# Patient Record
Sex: MALE | Race: WHITE | NOT HISPANIC OR LATINO | Employment: OTHER | URBAN - METROPOLITAN AREA
[De-identification: names, ages, dates, MRNs, and addresses within clinical notes are randomized per-mention and may not be internally consistent; named-entity substitution may affect disease eponyms.]

---

## 2018-01-01 ENCOUNTER — HOSPITAL ENCOUNTER (OUTPATIENT)
Dept: OTHER | Facility: HOSPITAL | Age: 73
Setting detail: SPECIMEN
Discharge: HOME | End: 2018-11-21
Attending: INTERNAL MEDICINE | Admitting: INTERNAL MEDICINE

## 2018-01-25 ENCOUNTER — HOSPITAL ENCOUNTER (OUTPATIENT)
Dept: PERIOP | Facility: HOSPITAL | Age: 73
Setting detail: HOSPITAL OUTPATIENT SURGERY
Discharge: HOME | End: 2018-01-25
Attending: SURGERY | Admitting: SURGERY

## 2018-01-25 LAB
GLUCOSE BLD-MCNC: 218 MG/DL (ref 70–105)
GLUCOSE BLD-MCNC: 266 MG/DL (ref 70–105)
GLUCOSE BLD-MCNC: ABNORMAL MG/DL (ref 70–105)

## 2018-01-26 LAB
BACTERIA SPEC AEROBE CULT: NORMAL
Lab: NORMAL
MICRO REPORT STATUS: NORMAL
SPECIMEN SOURCE: NORMAL

## 2018-04-27 ENCOUNTER — OFFICE (AMBULATORY)
Dept: URBAN - METROPOLITAN AREA CLINIC 64 | Facility: CLINIC | Age: 73
End: 2018-04-27

## 2018-04-27 VITALS
HEART RATE: 54 BPM | HEIGHT: 68 IN | WEIGHT: 154 LBS | DIASTOLIC BLOOD PRESSURE: 67 MMHG | SYSTOLIC BLOOD PRESSURE: 140 MMHG

## 2018-04-27 DIAGNOSIS — R14.3 FLATULENCE: ICD-10-CM

## 2018-04-27 PROCEDURE — 99202 OFFICE O/P NEW SF 15 MIN: CPT | Performed by: NURSE PRACTITIONER

## 2018-04-27 RX ORDER — SACCHAROMYCES BOULARDII 50 MG
500 CAPSULE ORAL
Qty: 60 | Refills: 2 | Status: ACTIVE
Start: 2018-04-27

## 2018-06-07 ENCOUNTER — OFFICE (AMBULATORY)
Dept: URBAN - METROPOLITAN AREA CLINIC 64 | Facility: CLINIC | Age: 73
End: 2018-06-07

## 2018-06-07 VITALS
SYSTOLIC BLOOD PRESSURE: 182 MMHG | HEIGHT: 68 IN | DIASTOLIC BLOOD PRESSURE: 83 MMHG | WEIGHT: 153 LBS | HEART RATE: 50 BPM

## 2018-06-07 DIAGNOSIS — R14.3 FLATULENCE: ICD-10-CM

## 2018-06-07 PROCEDURE — 99213 OFFICE O/P EST LOW 20 MIN: CPT | Performed by: NURSE PRACTITIONER

## 2018-06-07 RX ORDER — CIPROFLOXACIN 500 MG/1
1000 TABLET, FILM COATED ORAL
Qty: 20 | Refills: 0 | Status: COMPLETED
Start: 2018-06-07 | End: 2018-07-16

## 2018-07-16 ENCOUNTER — OFFICE (AMBULATORY)
Dept: URBAN - METROPOLITAN AREA CLINIC 64 | Facility: CLINIC | Age: 73
End: 2018-07-16

## 2018-07-16 VITALS
DIASTOLIC BLOOD PRESSURE: 59 MMHG | WEIGHT: 151 LBS | HEART RATE: 56 BPM | SYSTOLIC BLOOD PRESSURE: 117 MMHG | HEIGHT: 68 IN

## 2018-07-16 DIAGNOSIS — R14.3 FLATULENCE: ICD-10-CM

## 2018-07-16 DIAGNOSIS — Z12.11 ENCOUNTER FOR SCREENING FOR MALIGNANT NEOPLASM OF COLON: ICD-10-CM

## 2018-07-16 PROCEDURE — 99213 OFFICE O/P EST LOW 20 MIN: CPT | Performed by: NURSE PRACTITIONER

## 2018-08-28 ENCOUNTER — HOSPITAL ENCOUNTER (OUTPATIENT)
Dept: ONCOLOGY | Facility: CLINIC | Age: 73
Discharge: HOME | End: 2018-08-28
Attending: INTERNAL MEDICINE | Admitting: INTERNAL MEDICINE

## 2018-10-15 ENCOUNTER — OFFICE (AMBULATORY)
Dept: URBAN - METROPOLITAN AREA CLINIC 64 | Facility: CLINIC | Age: 73
End: 2018-10-15

## 2018-10-15 VITALS
HEIGHT: 68 IN | SYSTOLIC BLOOD PRESSURE: 162 MMHG | DIASTOLIC BLOOD PRESSURE: 66 MMHG | WEIGHT: 159 LBS | HEART RATE: 71 BPM

## 2018-10-15 DIAGNOSIS — R14.3 FLATULENCE: ICD-10-CM

## 2018-10-15 DIAGNOSIS — Z12.11 ENCOUNTER FOR SCREENING FOR MALIGNANT NEOPLASM OF COLON: ICD-10-CM

## 2018-10-15 PROCEDURE — 99213 OFFICE O/P EST LOW 20 MIN: CPT | Performed by: NURSE PRACTITIONER

## 2018-11-21 ENCOUNTER — ON CAMPUS - OUTPATIENT (AMBULATORY)
Dept: URBAN - METROPOLITAN AREA HOSPITAL 2 | Facility: HOSPITAL | Age: 73
End: 2018-11-21
Payer: OTHER GOVERNMENT

## 2018-11-21 ENCOUNTER — OFFICE (AMBULATORY)
Dept: URBAN - METROPOLITAN AREA PATHOLOGY 4 | Facility: PATHOLOGY | Age: 73
End: 2018-11-21
Payer: OTHER GOVERNMENT

## 2018-11-21 VITALS
HEART RATE: 56 BPM | HEART RATE: 61 BPM | RESPIRATION RATE: 16 BRPM | RESPIRATION RATE: 18 BRPM | DIASTOLIC BLOOD PRESSURE: 56 MMHG | OXYGEN SATURATION: 98 % | DIASTOLIC BLOOD PRESSURE: 38 MMHG | DIASTOLIC BLOOD PRESSURE: 40 MMHG | DIASTOLIC BLOOD PRESSURE: 53 MMHG | SYSTOLIC BLOOD PRESSURE: 83 MMHG | DIASTOLIC BLOOD PRESSURE: 66 MMHG | OXYGEN SATURATION: 99 % | OXYGEN SATURATION: 96 % | RESPIRATION RATE: 14 BRPM | SYSTOLIC BLOOD PRESSURE: 88 MMHG | DIASTOLIC BLOOD PRESSURE: 64 MMHG | SYSTOLIC BLOOD PRESSURE: 100 MMHG | HEIGHT: 68 IN | OXYGEN SATURATION: 100 % | HEART RATE: 46 BPM | HEART RATE: 43 BPM | RESPIRATION RATE: 15 BRPM | DIASTOLIC BLOOD PRESSURE: 88 MMHG | TEMPERATURE: 97.3 F | SYSTOLIC BLOOD PRESSURE: 185 MMHG | OXYGEN SATURATION: 95 % | SYSTOLIC BLOOD PRESSURE: 111 MMHG | SYSTOLIC BLOOD PRESSURE: 139 MMHG | OXYGEN SATURATION: 97 % | SYSTOLIC BLOOD PRESSURE: 108 MMHG | SYSTOLIC BLOOD PRESSURE: 119 MMHG | HEART RATE: 49 BPM | HEART RATE: 63 BPM | HEART RATE: 55 BPM | HEART RATE: 71 BPM | HEART RATE: 62 BPM | WEIGHT: 153.8 LBS | DIASTOLIC BLOOD PRESSURE: 68 MMHG

## 2018-11-21 DIAGNOSIS — D12.3 BENIGN NEOPLASM OF TRANSVERSE COLON: ICD-10-CM

## 2018-11-21 DIAGNOSIS — R19.5 OTHER FECAL ABNORMALITIES: ICD-10-CM

## 2018-11-21 DIAGNOSIS — K57.30 DIVERTICULOSIS OF LARGE INTESTINE WITHOUT PERFORATION OR ABS: ICD-10-CM

## 2018-11-21 DIAGNOSIS — Z15.09 GENETIC SUSCEPTIBILITY TO OTHER MALIGNANT NEOPLASM: ICD-10-CM

## 2018-11-21 DIAGNOSIS — K64.8 OTHER HEMORRHOIDS: ICD-10-CM

## 2018-11-21 PROBLEM — D12.2 BENIGN NEOPLASM OF ASCENDING COLON: Status: ACTIVE | Noted: 2018-11-21

## 2018-11-21 LAB
GI HISTOLOGY: A. UNSPECIFIED: (no result)
GI HISTOLOGY: PDF REPORT: (no result)

## 2018-11-21 PROCEDURE — 45385 COLONOSCOPY W/LESION REMOVAL: CPT | Performed by: INTERNAL MEDICINE

## 2018-11-21 PROCEDURE — 88305 TISSUE EXAM BY PATHOLOGIST: CPT | Mod: 26 | Performed by: INTERNAL MEDICINE

## 2018-11-21 RX ADMIN — INSULIN HUMAN 15 UNITS: 100 INJECTION, SOLUTION PARENTERAL at 11:13

## 2018-11-21 NOTE — SERVICEHPINOTES
+cologuard 10/18BRhistory of dementia, COPD, diabetes, CAD/CABG and kidney disease.BRNo family history of colon cancer and he denies previous colonoscopyBR  JOSTIN MARIN  is a  72  male   who presents today for a  Colonoscopy   for   the indications listed below. The updated Patient Profile was reviewed prior to the procedure, in conjunction with the Physical Exam, including medical conditions, surgical procedures, medications, allergies, family history and social history. See Physical Exam time stamp below for date and time of HPI completion.Pre-operatively, I reviewed the indication(s) for the procedure, the risks of the procedure [including but not limited to: unexpected bleeding possibly requiring hospitalization and/or unplanned repeat procedures, perforation possibly requiring surgical treatment, missed lesions and complications of sedation/MAC (also explained by anesthesia staff)]. I have evaluated the patient for risks associated with the planned anesthesia and the procedure to be performed and find the patient an acceptable candidate for IV sedation.Multiple opportunities were provided for any questions or concerns, and all questions were answered satisfactorily before any anesthesia was administered. We will proceed with the planned procedure.BR

## 2019-01-01 ENCOUNTER — HOSPITAL ENCOUNTER (OUTPATIENT)
Dept: ORTHOPEDIC SURGERY | Facility: CLINIC | Age: 74
Discharge: HOME | End: 2019-06-12
Attending: PODIATRIST | Admitting: PODIATRIST

## 2019-01-01 ENCOUNTER — TELEPHONE (OUTPATIENT)
Dept: ENDOCRINOLOGY | Facility: CLINIC | Age: 74
End: 2019-01-01

## 2019-01-01 ENCOUNTER — CONVERSION ENCOUNTER (OUTPATIENT)
Dept: CARDIOLOGY | Facility: CLINIC | Age: 74
End: 2019-01-01

## 2019-01-01 ENCOUNTER — HOSPITAL ENCOUNTER (OUTPATIENT)
Dept: ORTHOPEDIC SURGERY | Facility: CLINIC | Age: 74
Discharge: HOME | End: 2019-04-24
Attending: PODIATRIST | Admitting: PODIATRIST

## 2019-01-01 ENCOUNTER — OFFICE VISIT (OUTPATIENT)
Dept: NEUROLOGY | Facility: CLINIC | Age: 74
End: 2019-01-01

## 2019-01-01 ENCOUNTER — OFFICE VISIT (OUTPATIENT)
Dept: PODIATRY | Facility: CLINIC | Age: 74
End: 2019-01-01

## 2019-01-01 ENCOUNTER — HOSPITAL ENCOUNTER (OUTPATIENT)
Dept: ORTHOPEDIC SURGERY | Facility: CLINIC | Age: 74
Discharge: HOME | End: 2019-05-22
Attending: PODIATRIST | Admitting: PODIATRIST

## 2019-01-01 ENCOUNTER — OFFICE VISIT (OUTPATIENT)
Dept: ENDOCRINOLOGY | Facility: CLINIC | Age: 74
End: 2019-01-01

## 2019-01-01 VITALS
WEIGHT: 160 LBS | OXYGEN SATURATION: 96 % | HEIGHT: 68 IN | BODY MASS INDEX: 24.25 KG/M2 | SYSTOLIC BLOOD PRESSURE: 124 MMHG | DIASTOLIC BLOOD PRESSURE: 64 MMHG | HEART RATE: 59 BPM

## 2019-01-01 VITALS
SYSTOLIC BLOOD PRESSURE: 154 MMHG | HEIGHT: 66 IN | HEART RATE: 61 BPM | WEIGHT: 150 LBS | DIASTOLIC BLOOD PRESSURE: 65 MMHG | BODY MASS INDEX: 24.11 KG/M2

## 2019-01-01 VITALS
SYSTOLIC BLOOD PRESSURE: 134 MMHG | HEIGHT: 66 IN | DIASTOLIC BLOOD PRESSURE: 53 MMHG | BODY MASS INDEX: 25.88 KG/M2 | HEART RATE: 58 BPM | WEIGHT: 161 LBS

## 2019-01-01 VITALS
OXYGEN SATURATION: 97 % | DIASTOLIC BLOOD PRESSURE: 60 MMHG | HEART RATE: 51 BPM | BODY MASS INDEX: 22.81 KG/M2 | SYSTOLIC BLOOD PRESSURE: 125 MMHG | HEIGHT: 68 IN

## 2019-01-01 VITALS — BODY MASS INDEX: 22.43 KG/M2 | WEIGHT: 148 LBS | HEIGHT: 68 IN

## 2019-01-01 VITALS — OXYGEN SATURATION: 95 % | SYSTOLIC BLOOD PRESSURE: 67 MMHG | DIASTOLIC BLOOD PRESSURE: 37 MMHG | HEART RATE: 75 BPM

## 2019-01-01 DIAGNOSIS — E78.5 HYPERLIPIDEMIA, UNSPECIFIED HYPERLIPIDEMIA TYPE: ICD-10-CM

## 2019-01-01 DIAGNOSIS — E10.65 TYPE 1 DIABETES MELLITUS WITH HYPERGLYCEMIA (HCC): Primary | ICD-10-CM

## 2019-01-01 DIAGNOSIS — I10 ESSENTIAL HYPERTENSION: ICD-10-CM

## 2019-01-01 DIAGNOSIS — E78.2 MIXED HYPERLIPIDEMIA: ICD-10-CM

## 2019-01-01 DIAGNOSIS — E03.9 ACQUIRED HYPOTHYROIDISM: ICD-10-CM

## 2019-01-01 DIAGNOSIS — G25.81 RESTLESS LEGS SYNDROME: Primary | ICD-10-CM

## 2019-01-01 DIAGNOSIS — M25.571 ACUTE RIGHT ANKLE PAIN: Primary | ICD-10-CM

## 2019-01-01 DIAGNOSIS — E11.42 DIABETIC PERIPHERAL NEUROPATHY (HCC): ICD-10-CM

## 2019-01-01 LAB — GLUCOSE BLDC GLUCOMTR-MCNC: 278 MG/DL (ref 70–130)

## 2019-01-01 PROCEDURE — 82962 GLUCOSE BLOOD TEST: CPT | Performed by: INTERNAL MEDICINE

## 2019-01-01 PROCEDURE — 99213 OFFICE O/P EST LOW 20 MIN: CPT | Performed by: PODIATRIST

## 2019-01-01 PROCEDURE — 99214 OFFICE O/P EST MOD 30 MIN: CPT | Performed by: INTERNAL MEDICINE

## 2019-01-01 PROCEDURE — 99214 OFFICE O/P EST MOD 30 MIN: CPT | Performed by: PSYCHIATRY & NEUROLOGY

## 2019-01-01 RX ORDER — ASPIRIN 81 MG/1
81 TABLET ORAL DAILY
COMMUNITY

## 2019-01-01 RX ORDER — SACCHAROMYCES BOULARDII 250 MG
CAPSULE ORAL
COMMUNITY
Start: 2018-01-01

## 2019-01-01 RX ORDER — MULTIVITAMIN
TABLET ORAL
COMMUNITY
Start: 2015-09-11

## 2019-01-01 RX ORDER — HYDROCODONE BITARTRATE AND ACETAMINOPHEN 7.5; 325 MG/1; MG/1
TABLET ORAL
COMMUNITY
Start: 2014-10-20

## 2019-01-01 RX ORDER — CETIRIZINE HYDROCHLORIDE 10 MG/1
10 TABLET ORAL DAILY
COMMUNITY

## 2019-01-01 RX ORDER — ISOSORBIDE MONONITRATE 30 MG/1
TABLET, EXTENDED RELEASE ORAL EVERY 24 HOURS
COMMUNITY
Start: 2015-09-11

## 2019-01-01 RX ORDER — GABAPENTIN 600 MG/1
TABLET ORAL EVERY 24 HOURS
COMMUNITY
Start: 2019-01-01 | End: 2019-01-01

## 2019-01-01 RX ORDER — ROPINIROLE 2 MG/1
TABLET, FILM COATED ORAL EVERY 24 HOURS
COMMUNITY
Start: 2018-05-15

## 2019-01-01 RX ORDER — CALCIUM CARBONATE 300MG(750)
TABLET,CHEWABLE ORAL
COMMUNITY
Start: 2019-01-01

## 2019-01-01 RX ORDER — DONEPEZIL HYDROCHLORIDE 10 MG/1
TABLET, FILM COATED ORAL EVERY 12 HOURS
COMMUNITY
Start: 2019-01-01

## 2019-01-01 RX ORDER — IPRATROPIUM BROMIDE AND ALBUTEROL SULFATE 2.5; .5 MG/3ML; MG/3ML
SOLUTION RESPIRATORY (INHALATION)
COMMUNITY
Start: 2015-09-11

## 2019-01-01 RX ORDER — CHOLECALCIFEROL (VITAMIN D3) 1250 MCG
CAPSULE ORAL
COMMUNITY
Start: 2017-02-16 | End: 2019-01-01

## 2019-01-01 RX ORDER — PROMETHAZINE HYDROCHLORIDE 25 MG/1
TABLET ORAL
COMMUNITY
Start: 2019-01-01

## 2019-01-01 RX ORDER — LANOLIN ALCOHOL/MO/W.PET/CERES
CREAM (GRAM) TOPICAL
COMMUNITY
Start: 2018-01-01

## 2019-01-01 RX ORDER — ATORVASTATIN CALCIUM 10 MG/1
10 TABLET, FILM COATED ORAL DAILY
Qty: 30 TABLET | Refills: 11 | Status: SHIPPED | OUTPATIENT
Start: 2019-01-01 | End: 2020-10-23

## 2019-01-01 RX ORDER — ESCITALOPRAM OXALATE 20 MG/1
TABLET ORAL EVERY 24 HOURS
COMMUNITY
Start: 2014-10-20

## 2019-01-01 RX ORDER — BENZTROPINE MESYLATE 1 MG/1
TABLET ORAL EVERY 24 HOURS
COMMUNITY
Start: 2019-01-01

## 2019-01-01 RX ORDER — INSULIN GLARGINE 100 [IU]/ML
INJECTION, SOLUTION SUBCUTANEOUS
Qty: 10 ML | Refills: 4 | Status: SHIPPED | OUTPATIENT
Start: 2019-01-01

## 2019-01-01 RX ORDER — INSULIN GLARGINE 100 [IU]/ML
INJECTION, SOLUTION SUBCUTANEOUS
COMMUNITY
Start: 2015-09-11 | End: 2019-01-01 | Stop reason: SDUPTHER

## 2019-01-01 RX ORDER — DULOXETIN HYDROCHLORIDE 60 MG/1
CAPSULE, DELAYED RELEASE ORAL
COMMUNITY
Start: 2018-01-01

## 2019-01-01 RX ORDER — PREGABALIN 300 MG/1
1 CAPSULE ORAL EVERY 12 HOURS
COMMUNITY
Start: 2017-02-16

## 2019-01-01 RX ORDER — BUSPIRONE HYDROCHLORIDE 15 MG/1
TABLET ORAL DAILY
COMMUNITY
Start: 2018-01-01

## 2019-01-01 RX ORDER — LEVOTHYROXINE SODIUM 0.05 MG/1
TABLET ORAL
COMMUNITY
Start: 2014-10-20

## 2019-01-01 RX ORDER — BUDESONIDE 0.5 MG/2ML
INHALANT ORAL
COMMUNITY
Start: 2015-09-11

## 2019-01-01 RX ORDER — HYDRALAZINE HYDROCHLORIDE 25 MG/1
TABLET, FILM COATED ORAL EVERY 12 HOURS
COMMUNITY
Start: 2014-10-20 | End: 2019-01-01

## 2019-01-01 RX ORDER — PRAMIPEXOLE DIHYDROCHLORIDE 0.25 MG/1
TABLET ORAL
Qty: 60 TABLET | Refills: 11 | Status: SHIPPED | OUTPATIENT
Start: 2019-01-01

## 2019-01-01 RX ORDER — ROPINIROLE 4 MG/1
TABLET, FILM COATED ORAL NIGHTLY
COMMUNITY
Start: 2015-09-11

## 2019-01-01 RX ORDER — FERROUS SULFATE 325(65) MG
TABLET ORAL
COMMUNITY
Start: 2015-09-11

## 2019-01-01 RX ORDER — BACLOFEN 10 MG/1
TABLET ORAL
COMMUNITY
Start: 2017-02-16

## 2019-01-01 RX ORDER — ALBUTEROL SULFATE 90 UG/1
AEROSOL, METERED RESPIRATORY (INHALATION)
COMMUNITY
Start: 2014-10-20

## 2019-06-06 NOTE — PROGRESS NOTES
Visit Type:  Follow-up Visit  Referring Provider:  Dr Wright   Primary Provider:  Hipolito Wright MD    CC:  6 mo f/u CAD .    History of Present Illness:    73-year-old white male with history of coronary disease diabetes hypertension hyperlipidemia COPD congestive heart failure presents to office for follow-up.  Patient is not having any symptoms of chest pain or shortness of breath at rest or exertion.  No   complaints of any PND or orthopnea.  No palpitations dizziness syncope or swelling of the feet.  Says this these medicines regularly.  He does not smoke.  He is having very low blood pressure.   I have offered him to since the ER because of his low blood   pressure but is refusing.      Vital Signs:    Patient Profile:    73 Years Old Male  Unable to do weight check  Height:     66 inches  Pulse rate: 75 / minute  O2 Sat:     95 %  Room Air:   room air without exertion    BP sittin / 37  (right arm)  Cuff size:  regular   Vitals Entered By: Beatris Blevins CMA (May 28, 2019 12:40 PM)    Medications:  Medications were reviewed with the patient during this visit.    Allergies:   * KEPPRA (Critical)    Allergies were reviewed with the patient during this visit.    Current Allergies (reviewed today):  * KEPPRA (Critical)    Current Medications (including medications started today):   MAGNESIUM 400 MG ORAL TABLET (MAGNESIUM) Take one (1) tablet by mouth twice a day  PROMETHAZINE HCL 25 MG ORAL TABLET (PROMETHAZINE HCL) As directed  BENZTROPINE MESYLATE 1 MG ORAL TABLET (BENZTROPINE MESYLATE) Take 1 tablet by mouth daily  ARICEPT 10 MG ORAL TABLET (DONEPEZIL HCL) one po bid  GABAPENTIN 600 MG ORAL TABLET (GABAPENTIN) Take 1 tablet by mouth daily  * IB KIP 90MG 1-2 tablets up to three times per day  GLUCAGON EMERGENCY 1 MG INJECTION KIT (GLUCAGON (RDNA)) Use as directed  BUSPIRONE HCL 15 MG ORAL TABLET (BUSPIRONE HCL) Take one (1) tablet by mouth three times a day  MELATONIN 3 MG ORAL TABLET (MELATONIN) Take 1 tablet  by mouth daily at bedtime  FLORASTOR 250 MG ORAL CAPSULE (SACCHAROMYCES BOULARDII) Take one (1) tablet by mouth twice a day  CYMBALTA 60 MG ORAL CAPSULE DELAYED RELEASE PARTICLES (DULOXETINE HCL) Take 1 tablet by mouth daily  NOVOLOG 100 UNIT/ML SUBCUTANEOUS SOLUTION (INSULIN ASPART) -250=1unit 251-300=2 301-350=3 351-400=4 401-450=5 call doctor over 450.  Only give with meal, do not give at bedtime.  REQUIP 2 MG ORAL TABLET (ROPINIROLE HCL) Take 1 tablet by mouth daily  LEVOTHYROXINE SODIUM 50 MCG ORAL TABLET (LEVOTHYROXINE SODIUM) Take one by mouth daily  VITAMIN D3 97017 UNIT ORAL CAPSULE (CHOLECALCIFEROL) Take one capsule by mouth once a week  DAILY ISAIAH ORAL TABLET (MULTIPLE VITAMIN) Take 1 tablet by mouth daily  ISOSORBIDE MONONITRATE ER 30 MG ORAL TABLET EXTENDED RELEASE 24 HOUR (ISOSORBIDE MONONITRATE) 1 tablet daily  ROPINIROLE HCL 4 MG ORAL TABLET (ROPINIROLE HCL) Take 1 tablet by mouth daily  BACLOFEN 10 MG ORAL TABLET (BACLOFEN) Take 1 tablet by mouth daily at bedtime  FERROUS SULFATE 325 (65 FE) MG ORAL TABLET (FERROUS SULFATE) Take one (1) tablet by mouth three a day  HYDRALAZINE HCL 25 MG ORAL TABLET (HYDRALAZINE HCL) Take one (1) tablet by mouth twice a day  LYRICA 300 MG ORAL CAPSULE (PREGABALIN) 1 po bid  LEXAPRO 20 MG ORAL TABLET (ESCITALOPRAM OXALATE) Take 1 tablet by mouth daily  HYDROCODONE-ACETAMINOPHEN 7.5-325 MG ORAL TABLET (HYDROCODONE-ACETAMINOPHEN) Takes 1 every 4 hours  VENTOLIN  (90 Base) MCG/ACT INHALATION AEROSOL SOLUTION (ALBUTEROL SULFATE) 2 puffs every 4 hours as needed  LANTUS 100 UNIT/ML SUBCUTANEOUS SOLUTION (INSULIN GLARGINE) Inject 12 units subqutaneously at bedtime  NOVOLOG 100 UNIT/ML SUBCUTANEOUS SOLUTION (INSULIN ASPART) inject 4 units before Breakfast,  and 6 units before lunch and dinner  plus SSI. give right at mealtime  IPRATROPIUM-ALBUTEROL 0.5-2.5 (3) MG/3ML INHALATION SOLUTION (IPRATROPIUM-ALBUTEROL) 3 ml via nebulizer every 6 hours  BUDESONIDE 0.5  MG/2ML INHALATION SUSPENSION (BUDESONIDE) inhale 2 ml via nebulizer every 12 hours      Past Medical History:     Reviewed history from 10/25/2018 and no changes required:        Hypertension        Hyperlipidemia                early dementia        Coronary Artery Disease        CVA        Hepatic failure        Thrombocytopenia        Diabetes, Type 1        diabetic neuropathy.     Past Surgical History:     Reviewed history from 02/14/2018 and no changes required:        CABG - 2011        heart cath 11/2014 - 100% LAD occlusion, 50% RCA stenosis, and left circumflex free of disease, patent LIMA to LAD, SVG graft to diagonal branch patent, SVG to left circumflex occluded        Ventral hernia repair  01/25/18  Dr Woods    Family History Summary:      Reviewed history Last on 05/22/2019 and no changes required:05/28/2019  Mother - Has Family History of Heart Disease - Entered On: 2/16/2017    General Comments - FH:  FH Heart Disease mother  FH Stroke - father  diabetes-son      Social History:     Reviewed history from 11/13/2018 and no changes required:        Alcohol Use: N        Drug Use: N        HIV/High Risk: N        Regular Exercise: N                Smoking History:        Patient is a former smoker.    Social History Summary:  Patient is a former smoker.  Alcohol Use: N  Drug Use: N  HIV/High Risk: N  Regular Exercise: N  Social History Reviewed: 05/28/2019          Risk Factors:     Smoked Tobacco Use:  Former smoker     Cigarettes:  Yes -- 0.5 PPD pack(s) per day,      Year quit:  2018        Years Since Last Quit:  1      Tobacco Use Comments:  Patient is advised to stop smoking.  Drug use:  no  HIV high-risk behavior:  no  Caffeine use:  1 drinks per day  Alcohol use:  no  Exercise:  no  Seatbelt use:  100 %  Sun Exposure:  rarely    Family History Risk Factors:     Family History of MI in females < 65 years old:  no     Family History of MI in males < 55 years old:  no    Previous Tobacco Use:  Signed On - 05/22/2019  Smoked Tobacco Use:  Former smoker     Cigarettes:  Yes -- 0.5 PPD pack(s) per day,      Year quit:  2018        Years Since Last Quit:  1 years, 4 months, 27 days     Counseled to quit/cut down:  yes     Tobacco Use Comments:  Patient is advised to stop smoking.  Drug use:  no  HIV high-risk behavior:  no  Caffeine use:  1 drinks per day    Previous Alcohol Use: Signed On - 05/22/2019  Alcohol use:  no  Exercise:  no  Seatbelt use:  100 %  Sun Exposure:  rarely    Family History Risk Factors:     Family History of MI in females < 65 years old:  no     Family History of MI in males < 55 years old:  no        Review of Systems     General       Denies fever, chills, sweats, fatigue and weakness.    Eyes       Denies blurring.    CV       Denies chest pain or discomfort, racing/skipping heart beats, fatigue, lightheadedness, shortness of breath with exertion, palpitations, swelling of hands or feet and difficulty breathing while lying down.    Resp       Denies cough and shortness of breath.    GI       Denies loss of appetite, nausea, vomiting, abdominal pain, diarrhea and constipation.    Derm       Denies rash.    Neuro       Denies numbness and weakness.    Heme       Complains of bleeding.       Denies nose bleeds.      Physical Exam    General:       frail and elderly male; no acute distress.  Head:      normocephalic and atraumatic.    Eyes:      PERRL/EOM intact, conjunctiva and sclera clear with out nystagmus.    Neck:      no bruit and no JVD.    Lungs:      clear bilaterally to auscultation.    Heart:      non-displaced PMI, chest non-tender; regular rate and rhythm, S1, S2 without murmurs, rubs, or gallops  Abdomen:      non-tender.    Msk:      decreased ROM.    Pulses:      pulses normal in all 4 extremities.    Extremities:      no pedal edema.    Neurologic:       focal deficits  Skin:      intact without lesions or rashes.    Psych:      alert and cooperative; normal mood and  affect; normal attention span and concentration.      Diabetes Management Exam:      Foot Exam (with socks and/or shoes not present):        Pulses:           pulses normal in all 4 extremities.        Blood Pressure:  Today's BP: 67/37 mm Hg            Impression & Recommendations:    Problem # 1:  Diabetes mellitus, type I, uncontrolled (ICD-250.03) (RWH33-M14.65)   patient's sugar levels are followed by a primary doctor  His updated medication list for this problem includes:     Glucagon Emergency 1 Mg Injection Kit (Glucagon (rdna)) ..... Use as directed     Novolog 100 Unit/ml Subcutaneous Solution (Insulin aspart) ..... Ssi 201-250=1unit 251-300=2 301-350=3 351-400=4 401-450=5 call doctor over 450.  only give with meal, do not give at bedtime.     Lantus 100 Unit/ml Subcutaneous Solution (Insulin glargine) ..... Inject 12 units subqutaneously at bedtime     Novolog 100 Unit/ml Subcutaneous Solution (Insulin aspart) ..... Inject 4 units before breakfast,  and 6 units before lunch and dinner  plus ssi. give right at mealtime      Problem # 2:  Hyperlipidemia (ICD-272.4) (DTR01-J78.5)   patient's lipid levels are followed by the primary care doctor    Problem # 3:  Hypertension (ICD-401.9) (CCZ31-F86)   patient blood pressure is very low and I have asked stop his medicines have asked to go to the ER for IV fluids but is refusing it.  I will watch him here in my office for a few more minutes and recheck his blood pressure  His updated medication list for this problem includes:     Hydralazine Hcl 25 Mg Oral Tablet (Hydralazine hcl) ..... Take one (1) tablet by mouth twice a day      Problem # 4:  CONGESTIVE HEART FAILURE (ICD-428.0) (BRO61-M61.9)   patient has class 1 symptoms at this time    Problem # 5:  Coronary artery disease, S/P CABG (ICD-414.00) (QBD68-K97.810)   patient had Coronary bypass the x3 vessels with a LIMA to LAD and saphenous graft to the marginal branch of the RCA and is currently stable on  medications  His updated medication list for this problem includes:     Isosorbide Mononitrate Er 30 Mg Oral Tablet Extended Release 24 Hour (Isosorbide mononitrate) ..... 1 tablet daily     Hydralazine Hcl 25 Mg Oral Tablet (Hydralazine hcl) ..... Take one (1) tablet by mouth twice a day                    Medication Administration    Orders Added:  1)  26205-[id number] Vst-Est Level III [CPT-42961]    ]      Electronically signed by Cornell Pitts MD on 05/28/2019 at 1:05 PM  ________________________________________________________________________       Disclaimer: Converted Note message may not contain all data elements that existed in the [employer] source system. Please see [employer] for the original note details.

## 2019-06-13 NOTE — PROGRESS NOTES
Visit Type:  Acute Visit  Referring Provider:  Dr Wright   Primary Provider:  Hipolito Wright MD    CC:   Right ankle FX F/U.    History of Present Illness:  Patient states that he is doing well.  He denies any new issues.  He is eager to return to baseline activity.      Past Medical History:     Reviewed history from 10/25/2018 and no changes required:        Hypertension        Hyperlipidemia                early dementia        Coronary Artery Disease        CVA        Hepatic failure        Thrombocytopenia        Diabetes, Type 1        diabetic neuropathy.     Past Surgical History:     Reviewed history from 02/14/2018 and no changes required:        CABG - 2011        heart cath 11/2014 - 100% LAD occlusion, 50% RCA stenosis, and left circumflex free of disease, patent LIMA to LAD, SVG graft to diagonal branch patent, SVG to left circumflex occluded        Ventral hernia repair  01/25/18  Dr Woods    Active Medications (reviewed today):  MAGNESIUM 400 MG ORAL TABLET (MAGNESIUM) Take one (1) tablet by mouth twice a day  PROMETHAZINE HCL 25 MG ORAL TABLET (PROMETHAZINE HCL) As directed  BENZTROPINE MESYLATE 1 MG ORAL TABLET (BENZTROPINE MESYLATE) Take 1 tablet by mouth daily  ARICEPT 10 MG ORAL TABLET (DONEPEZIL HCL) one po bid  GABAPENTIN 600 MG ORAL TABLET (GABAPENTIN) Take 1 tablet by mouth daily  * IB KIP 90MG 1-2 tablets up to three times per day  GLUCAGON EMERGENCY 1 MG INJECTION KIT (GLUCAGON (RDNA)) Use as directed  BUSPIRONE HCL 15 MG ORAL TABLET (BUSPIRONE HCL) Take one (1) tablet by mouth three times a day  MELATONIN 3 MG ORAL TABLET (MELATONIN) Take 1 tablet by mouth daily at bedtime  FLORASTOR 250 MG ORAL CAPSULE (SACCHAROMYCES BOULARDII) Take one (1) tablet by mouth twice a day  CYMBALTA 60 MG ORAL CAPSULE DELAYED RELEASE PARTICLES (DULOXETINE HCL) Take 1 tablet by mouth daily  NOVOLOG 100 UNIT/ML SUBCUTANEOUS SOLUTION (INSULIN ASPART) -250=1unit 251-300=2 301-350=3 351-400=4 401-450=5 call  doctor over 450.  Only give with meal, do not give at bedtime.  REQUIP 2 MG ORAL TABLET (ROPINIROLE HCL) Take 1 tablet by mouth daily  LEVOTHYROXINE SODIUM 50 MCG ORAL TABLET (LEVOTHYROXINE SODIUM) Take one by mouth daily  VITAMIN D3 42101 UNIT ORAL CAPSULE (CHOLECALCIFEROL) Take one capsule by mouth once a week  DAILY ISAIAH ORAL TABLET (MULTIPLE VITAMIN) Take 1 tablet by mouth daily  ISOSORBIDE MONONITRATE ER 30 MG ORAL TABLET EXTENDED RELEASE 24 HOUR (ISOSORBIDE MONONITRATE) 1 tablet daily  ROPINIROLE HCL 4 MG ORAL TABLET (ROPINIROLE HCL) Take 1 tablet by mouth daily  BACLOFEN 10 MG ORAL TABLET (BACLOFEN) Take 1 tablet by mouth daily at bedtime  FERROUS SULFATE 325 (65 FE) MG ORAL TABLET (FERROUS SULFATE) Take one (1) tablet by mouth three a day  HYDRALAZINE HCL 25 MG ORAL TABLET (HYDRALAZINE HCL) Take one (1) tablet by mouth twice a day  LYRICA 300 MG ORAL CAPSULE (PREGABALIN) 1 po bid  LEXAPRO 20 MG ORAL TABLET (ESCITALOPRAM OXALATE) Take 1 tablet by mouth daily  HYDROCODONE-ACETAMINOPHEN 7.5-325 MG ORAL TABLET (HYDROCODONE-ACETAMINOPHEN) Takes 1 every 4 hours  VENTOLIN  (90 Base) MCG/ACT INHALATION AEROSOL SOLUTION (ALBUTEROL SULFATE) 2 puffs every 4 hours as needed  LANTUS 100 UNIT/ML SUBCUTANEOUS SOLUTION (INSULIN GLARGINE) Inject 12 units subqutaneously at bedtime  NOVOLOG 100 UNIT/ML SUBCUTANEOUS SOLUTION (INSULIN ASPART) inject 4 units before Breakfast,  and 6 units before lunch and dinner  plus SSI. give right at mealtime  IPRATROPIUM-ALBUTEROL 0.5-2.5 (3) MG/3ML INHALATION SOLUTION (IPRATROPIUM-ALBUTEROL) 3 ml via nebulizer every 6 hours  BUDESONIDE 0.5 MG/2ML INHALATION SUSPENSION (BUDESONIDE) inhale 2 ml via nebulizer every 12 hours    Current Allergies (reviewed today):  * KEPPRA (Critical)    Current Medications (including medications started today):   MAGNESIUM 400 MG ORAL TABLET (MAGNESIUM) Take one (1) tablet by mouth twice a day  PROMETHAZINE HCL 25 MG ORAL TABLET (PROMETHAZINE HCL) As  directed  BENZTROPINE MESYLATE 1 MG ORAL TABLET (BENZTROPINE MESYLATE) Take 1 tablet by mouth daily  ARICEPT 10 MG ORAL TABLET (DONEPEZIL HCL) one po bid  GABAPENTIN 600 MG ORAL TABLET (GABAPENTIN) Take 1 tablet by mouth daily  * IB KIP 90MG 1-2 tablets up to three times per day  GLUCAGON EMERGENCY 1 MG INJECTION KIT (GLUCAGON (RDNA)) Use as directed  BUSPIRONE HCL 15 MG ORAL TABLET (BUSPIRONE HCL) Take one (1) tablet by mouth three times a day  MELATONIN 3 MG ORAL TABLET (MELATONIN) Take 1 tablet by mouth daily at bedtime  FLORASTOR 250 MG ORAL CAPSULE (SACCHAROMYCES BOULARDII) Take one (1) tablet by mouth twice a day  CYMBALTA 60 MG ORAL CAPSULE DELAYED RELEASE PARTICLES (DULOXETINE HCL) Take 1 tablet by mouth daily  NOVOLOG 100 UNIT/ML SUBCUTANEOUS SOLUTION (INSULIN ASPART) -250=1unit 251-300=2 301-350=3 351-400=4 401-450=5 call doctor over 450.  Only give with meal, do not give at bedtime.  REQUIP 2 MG ORAL TABLET (ROPINIROLE HCL) Take 1 tablet by mouth daily  LEVOTHYROXINE SODIUM 50 MCG ORAL TABLET (LEVOTHYROXINE SODIUM) Take one by mouth daily  VITAMIN D3 01196 UNIT ORAL CAPSULE (CHOLECALCIFEROL) Take one capsule by mouth once a week  DAILY ISAIAH ORAL TABLET (MULTIPLE VITAMIN) Take 1 tablet by mouth daily  ISOSORBIDE MONONITRATE ER 30 MG ORAL TABLET EXTENDED RELEASE 24 HOUR (ISOSORBIDE MONONITRATE) 1 tablet daily  ROPINIROLE HCL 4 MG ORAL TABLET (ROPINIROLE HCL) Take 1 tablet by mouth daily  BACLOFEN 10 MG ORAL TABLET (BACLOFEN) Take 1 tablet by mouth daily at bedtime  FERROUS SULFATE 325 (65 FE) MG ORAL TABLET (FERROUS SULFATE) Take one (1) tablet by mouth three a day  HYDRALAZINE HCL 25 MG ORAL TABLET (HYDRALAZINE HCL) Take one (1) tablet by mouth twice a day  LYRICA 300 MG ORAL CAPSULE (PREGABALIN) 1 po bid  LEXAPRO 20 MG ORAL TABLET (ESCITALOPRAM OXALATE) Take 1 tablet by mouth daily  HYDROCODONE-ACETAMINOPHEN 7.5-325 MG ORAL TABLET (HYDROCODONE-ACETAMINOPHEN) Takes 1 every 4 hours  VENTOLIN   (90 Base) MCG/ACT INHALATION AEROSOL SOLUTION (ALBUTEROL SULFATE) 2 puffs every 4 hours as needed  LANTUS 100 UNIT/ML SUBCUTANEOUS SOLUTION (INSULIN GLARGINE) Inject 12 units subqutaneously at bedtime  NOVOLOG 100 UNIT/ML SUBCUTANEOUS SOLUTION (INSULIN ASPART) inject 4 units before Breakfast,  and 6 units before lunch and dinner  plus SSI. give right at mealtime  IPRATROPIUM-ALBUTEROL 0.5-2.5 (3) MG/3ML INHALATION SOLUTION (IPRATROPIUM-ALBUTEROL) 3 ml via nebulizer every 6 hours  BUDESONIDE 0.5 MG/2ML INHALATION SUSPENSION (BUDESONIDE) inhale 2 ml via nebulizer every 12 hours          Vital Signs:    Patient Profile:    73 Years Old Male  Height:     66 inches  Weight:     150 pounds  BMI:        24.21     Pulse rate: 61 / minute  BP Sittin / 65  (left arm)    Cuff size:  regular      Problems: Active problems were reviewed with the patient during this visit.  Medications: Medications were reviewed with the patient during this visit.  Allergies: Allergies were reviewed with the patient during this visit.        Vitals Entered By: Kalina Barrett CMA (2019 1:30 PM)    Family History Summary:      Reviewed history Last on 2019 and no changes required:2019  Mother - Has Family History of Heart Disease - Entered On: 2017    General Comments - FH:  FH Heart Disease mother  FH Stroke - father  diabetes-son      Social History:     Reviewed history from 2019 and no changes required:        Alcohol Use: N        Drug Use: N        HIV/High Risk: N        Regular Exercise: N                Smoking History:        Patient is a former smoker.        Risk Factors:     Smoked Tobacco Use:  Former smoker     Cigarettes:  Yes -- 0.5 PPD pack(s) per day,      Year quit:  2018        Years Since Last Quit:  1      Tobacco Use Comments:  Patient is advised to stop smoking.  Drug use:  no  HIV high-risk behavior:  no  Caffeine use:  1 drinks per day  Alcohol use:  no  Exercise:  no  Seatbelt use:   100 %  Sun Exposure:  rarely    Family History Risk Factors:     Family History of MI in females < 65 years old:  no     Family History of MI in males < 55 years old:  no      Podiatry Exam       General Appearance:   frail and elderly male; no acute distress.  Mental Status Exam        Judgement and Insight:  Intact       Orientation:  Oriented to time, place, and person  Cardiovascular (Right)       Dorsalis Pedis Pulse (Rt):  2/4       Posterior Tibialis Pulse (Rt):  2/4       Capillary Filling Time (Rt):  1-3 Seconds       Edema (Rt):  No Edema  Dermatological Exam       Temperature:  warm to warm       Skin Elasticity:  normal skin elasticity  Neurological Exam (Right)       Paresthesia (Rt):  negative       Achilles DTRs (Rt):  symmetric       Tinel over Tarsal Tunnel (Rt):  negative  Monofilament Test (Rt):   intact      MusculoSkeletal Exam (Right)       Gait and Stance (Rt):   deferred       ROM (Rt):   ankle range of motion is supple bone mildly tender.       Muscle Strength (Rt):   deferred       Subluxed Digits (Rt):  no subluxation or laxity of joints  Additional Musculoskelatal Findings    No obvious deformity or instability.  Pain with palpation to the lateral malleolus.        Impression & Recommendations:    Problem # 1:  Nondisplaced fracture of lateral malleolus of right fibula, initial encounter for closed fracture (ICD-824.2) ([id number]-C93.64xA)    Patient appears to be doing quite well.  Imaging was performed showing stable alignment of the fracture sites.  No substantial signs of healing is identified at this time.  I have asked that he progress to weight-bearing activity as needed with walker   assistance in the Cam boot.  He may proceed with physical therapy and baseline activity in the boot.  I would like to see him in 4 weeks for re-evaluation and imaging.  Orders:  Ankle, 3 views-TC only ([id number])  Post-op Visit ([id number])                    Medication Administration    Orders Added:  1)   Ankle, 3 views-TC only [[id number]]  2)  Post-op Visit [[id number]]    ]      Electronically signed by Sunday Thornton on 06/12/2019 at 2:05 PM  ________________________________________________________________________       Disclaimer: Converted Note message may not contain all data elements that existed in the legacy source system. Please see [employer] for the original note details.

## 2019-07-08 PROBLEM — E61.1 IRON DEFICIENCY: Status: ACTIVE | Noted: 2018-10-25

## 2019-07-08 PROBLEM — G25.81 RESTLESS LEGS SYNDROME: Status: ACTIVE | Noted: 2018-10-25

## 2019-07-08 PROBLEM — R07.9 CHEST PAIN: Status: ACTIVE | Noted: 2017-02-16

## 2019-07-08 PROBLEM — G25.0 ESSENTIAL TREMOR: Status: ACTIVE | Noted: 2018-10-25

## 2019-07-08 PROBLEM — S82.64XA CLOSED NONDISPLACED FRACTURE OF LATERAL MALLEOLUS OF RIGHT FIBULA: Status: ACTIVE | Noted: 2019-01-01

## 2019-07-11 NOTE — PROGRESS NOTES
Endocrine Progress Note Outpatient     Patient Care Team:  Hipolito Wright MD as PCP - General  Provider, No Known as PCP - Family Medicine    Chief Complaint: Follow-up diabetes    HPI: 73-year-old male with history of type 1 diabetes brittle, history of hypothyroidism and hypertension hyperlipidemia is here for follow-up.  He is currently on Lantus 15 units daily along with NovoLog 6 units twice a day as well as sliding scale.  He lives in his ready and blood sugars been running high.  Sugars.  Hypertension well controlled  Hyperlipidemia: Well-controlled  Hypothyroidism: On levothyroxine supplementation.    Past Medical History:   Diagnosis Date   • CAD (coronary artery disease)    • CVA (cerebral vascular accident) (CMS/HCC)    • Dementia     early stages   • Diabetes mellitus type 1 (CMS/HCC)    • Diabetic neuropathy (CMS/HCC)    • Hepatic failure (CMS/HCC)    • Hyperlipidemia    • Hypertension    • Thrombocytopenia (CMS/HCC)        Social History     Socioeconomic History   • Marital status:      Spouse name: Not on file   • Number of children: Not on file   • Years of education: Not on file   • Highest education level: Not on file   Tobacco Use   • Smoking status: Former Smoker   Substance and Sexual Activity   • Alcohol use: No     Frequency: Never       Family History   Problem Relation Age of Onset   • Heart disease Mother    • Stroke Father    • Diabetes Son        Allergies   Allergen Reactions   • Keppra  [Levetiracetam] Rash       ROS:   Constitutional:  Denies fatigue, tiredness.    Eyes:  Denies change in visual acuity   HENT:  Denies nasal congestion or sore throat   Respiratory: denies cough, shortness of breath.   Cardiovascular:  denies chest pain, edema   GI:  Denies abdominal pain, nausea, vomiting.   Musculoskeletal:  Denies back pain or joint pain   Integument:  Denies dry skin and rash   Neurologic:  Denies headache, focal weakness or sensory changes   Endocrine:  Denies polyuria or  polydipsia   Psychiatric:  Denies depression or anxiety      Vitals:    07/11/19 1101   BP: 125/60   Pulse: 51   SpO2: 97%       Physical Exam:  GEN: NAD, conversant  EYES: EOMI, PERRL, no conjunctival erythema  NECK: no thyromegaly, full ROM   CV: RRR, no murmurs/rubs/gallops, no peripheral edema  LUNG: CTAB, no wheezes/rales/ronchi  SKIN: no rashes, no acanthosis  MSK: no deformities, full ROM of all extremities  NEURO: no tremors, DTR normal  PSYCH: AOX3, appropriate mood, affect normal      Results Review:     I reviewed the patient's new clinical results.    No results found for: HGBA1C   Lab Results   Component Value Date    GLUCOSE 264 (H) 01/12/2018    BUN 22 (H) 01/12/2018    CREATININE 1.0 01/12/2018    BCR 22.0 (H) 01/12/2018    K 3.8 01/12/2018    CO2 23 01/12/2018    CALCIUM 8.5 (L) 01/12/2018    ALBUMIN 2.9 (L) 01/10/2018    AST 40 01/10/2018    ALT 27 01/10/2018    CHOL 188 04/01/2017    TRIG 72 04/01/2017    LDL 81 04/01/2017    HDL 82 04/01/2017     Lab Results   Component Value Date    TSH 2.47 03/31/2017         Medication Review: Reviewed.       Current Outpatient Medications:   •  albuterol sulfate HFA (VENTOLIN HFA) 108 (90 Base) MCG/ACT inhaler, VENTOLIN  (90 Base) MCG/ACT AERS, Disp: , Rfl:   •  baclofen (LIORESAL) 10 MG tablet, BACLOFEN 10 MG TABS, Disp: , Rfl:   •  benztropine (COGENTIN) 1 MG tablet, Daily., Disp: , Rfl:   •  budesonide (PULMICORT) 0.5 MG/2ML nebulizer solution, BUDESONIDE 0.5 MG/2ML SUSP, Disp: , Rfl:   •  busPIRone (BUSPAR) 15 MG tablet, Every 8 (Eight) Hours., Disp: , Rfl:   •  Cholecalciferol (VITAMIN D3) 21458 units capsule, VITAMIN D3 80489 UNIT CAPS, Disp: , Rfl:   •  donepezil (ARICEPT) 10 MG tablet, Every 12 (Twelve) Hours., Disp: , Rfl:   •  DULoxetine (CYMBALTA) 60 MG capsule, CYMBALTA 60 MG CPEP, Disp: , Rfl:   •  escitalopram (LEXAPRO) 20 MG tablet, Daily., Disp: , Rfl:   •  ferrous sulfate 325 (65 FE) MG tablet, FERROUS SULFATE 325 (65 Fe) MG TABS,  "Disp: , Rfl:   •  gabapentin (NEURONTIN) 600 MG tablet, Daily., Disp: , Rfl:   •  glucagon (GLUCAGON EMERGENCY) 1 MG injection, GLUCAGON EMERGENCY 1 MG KIT, Disp: , Rfl:   •  hydrALAZINE (APRESOLINE) 25 MG tablet, Every 12 (Twelve) Hours., Disp: , Rfl:   •  HYDROcodone-acetaminophen (NORCO) 7.5-325 MG per tablet, HYDROCODONE-ACETAMINOPHEN 7.5-325 MG TABS, Disp: , Rfl:   •  insulin aspart (NOVOLOG) 100 UNIT/ML injection, NOVOLOG 100 UNIT/ML SOLN, Disp: , Rfl:   •  insulin glargine (LANTUS) 100 UNIT/ML injection, LANTUS 100 UNIT/ML SOLN, Disp: , Rfl:   •  ipratropium-albuterol (DUO-NEB) 0.5-2.5 mg/3 ml nebulizer, IPRATROPIUM-ALBUTEROL 0.5-2.5 (3) MG/3ML SOLN, Disp: , Rfl:   •  isosorbide mononitrate (IMDUR) 30 MG 24 hr tablet, Daily., Disp: , Rfl:   •  levothyroxine (SYNTHROID, LEVOTHROID) 50 MCG tablet, LEVOTHYROXINE SODIUM 50 MCG TABS, Disp: , Rfl:   •  Magnesium 400 MG tablet, MAGNESIUM 400 MG TABS, Disp: , Rfl:   •  melatonin 3 MG tablet, MELATONIN 3 MG TABS, Disp: , Rfl:   •  multivitamin (DAILY ISAIAH) tablet tablet, DAILY ISAIAH TABS, Disp: , Rfl:   •  pregabalin (LYRICA) 300 MG capsule, 1 capsule Every 12 (Twelve) Hours., Disp: , Rfl:   •  promethazine (PHENERGAN) 25 MG tablet, PROMETHAZINE HCL 25 MG TABS, Disp: , Rfl:   •  rOPINIRole (REQUIP) 2 MG tablet, Daily., Disp: , Rfl:   •  rOPINIRole (REQUIP) 4 MG tablet, Daily., Disp: , Rfl:   •  saccharomyces boulardii (FLORASTOR) 250 MG capsule, FLORASTOR 250 MG CAPS, Disp: , Rfl:       Assessment/Plan   Diabetes mellitus type I: Uncontrolled, will increase Lantus to 16 units subcu daily and continue NovoLog to 6 units with each meal along with sliding scale.  2.  Hypertension: Well-controlled  3.  Hyperlipidemia: Follow lipid panel  4.  Hypothyroidism: On levothyroxine, will follow TSH.            Kelli Hinds MD FACE.  06/15/19  4:34 PM      EMR Dragon / transcription disclaimer:     \"Dictated utilizing Dragon dictation\".                 "

## 2019-07-11 NOTE — PATIENT INSTRUCTIONS
Please increase Lantus to 16 units subcu daily  Please change NovoLog to 6 units with each meal and  Please send blood sugar records for review.

## 2019-10-09 NOTE — PROGRESS NOTES
10/09/2019  Foot and Ankle Surgery - Established Patient/Follow-up  Provider: Dr. Sunday Thornton DPM  Location: [facility]    Subjective:  Mark Rivera is a 73 y.o. male.     Chief Complaint   Patient presents with   • Right Ankle - Follow-up       HPI: Patient returns for what he thought was a follow-up regarding his right ankle fracture.  He was seen earlier this year regarding the lateral malleolus fracture.  He has remained in the cam boot and been performing weightbearing activities as directed.  He did not know that he was supposed to follow-up several months ago.  He denies any new issues and has no pain to the ankle    Allergies   Allergen Reactions   • Keppra  [Levetiracetam] Rash       Current Outpatient Medications on File Prior to Visit   Medication Sig Dispense Refill   • baclofen (LIORESAL) 10 MG tablet BACLOFEN 10 MG TABS     • benztropine (COGENTIN) 1 MG tablet Daily.     • budesonide (PULMICORT) 0.5 MG/2ML nebulizer solution BUDESONIDE 0.5 MG/2ML SUSP     • busPIRone (BUSPAR) 15 MG tablet Every 8 (Eight) Hours.     • Cholecalciferol (VITAMIN D3) 13711 units capsule VITAMIN D3 70939 UNIT CAPS     • donepezil (ARICEPT) 10 MG tablet Every 12 (Twelve) Hours.     • DULoxetine (CYMBALTA) 60 MG capsule CYMBALTA 60 MG CPEP     • escitalopram (LEXAPRO) 20 MG tablet Daily.     • ferrous sulfate 325 (65 FE) MG tablet FERROUS SULFATE 325 (65 Fe) MG TABS     • glucagon (GLUCAGON EMERGENCY) 1 MG injection GLUCAGON EMERGENCY 1 MG KIT     • hydrALAZINE (APRESOLINE) 25 MG tablet Every 12 (Twelve) Hours.     • HYDROcodone-acetaminophen (NORCO) 7.5-325 MG per tablet HYDROCODONE-ACETAMINOPHEN 7.5-325 MG TABS     • insulin aspart (NOVOLOG) 100 UNIT/ML injection NOVOLOG 100 UNIT/ML SOLN     • insulin glargine (LANTUS) 100 UNIT/ML injection LANTUS 100 UNIT/ML SOLN     • ipratropium-albuterol (DUO-NEB) 0.5-2.5 mg/3 ml nebulizer IPRATROPIUM-ALBUTEROL 0.5-2.5 (3) MG/3ML SOLN     • isosorbide mononitrate (IMDUR)  "30 MG 24 hr tablet Daily.     • levothyroxine (SYNTHROID, LEVOTHROID) 50 MCG tablet LEVOTHYROXINE SODIUM 50 MCG TABS     • Magnesium 400 MG tablet MAGNESIUM 400 MG TABS     • melatonin 3 MG tablet MELATONIN 3 MG TABS     • multivitamin (DAILY ISAIAH) tablet tablet DAILY ISAIAH TABS     • pregabalin (LYRICA) 300 MG capsule 1 capsule Every 12 (Twelve) Hours.     • promethazine (PHENERGAN) 25 MG tablet PROMETHAZINE HCL 25 MG TABS     • rOPINIRole (REQUIP) 2 MG tablet Daily.     • rOPINIRole (REQUIP) 4 MG tablet Daily.     • saccharomyces boulardii (FLORASTOR) 250 MG capsule FLORASTOR 250 MG CAPS     • albuterol sulfate HFA (VENTOLIN HFA) 108 (90 Base) MCG/ACT inhaler VENTOLIN  (90 Base) MCG/ACT AERS     • [DISCONTINUED] gabapentin (NEURONTIN) 600 MG tablet Daily.       No current facility-administered medications on file prior to visit.        Objective   Ht 172.7 cm (68\")   Wt 67.1 kg (148 lb)   BMI 22.50 kg/m²     Podiatry Exam       General Appearance:   frail and elderly male; no acute distress.  Mental Status Exam        Judgement and Insight:  Intact       Orientation:  Oriented to time, place, and person  Cardiovascular (Right)       Dorsalis Pedis Pulse (Rt):  2/4       Posterior Tibialis Pulse (Rt):  2/4       Capillary Filling Time (Rt):  1-3 Seconds       Edema (Rt):  No Edema  Dermatological Exam       Temperature:  warm to warm       Skin Elasticity:  normal skin elasticity  Neurological Exam (Right)       Paresthesia (Rt):  negative       Achilles DTRs (Rt):  symmetric       Tinel over Tarsal Tunnel (Rt):  negative  Monofilament Test (Rt):   intact        MusculoSkeletal Exam (Right)       Gait and Stance (Rt):   deferred       ROM (Rt):   ankle range of motion is supple bone mildly tender.       Muscle Strength (Rt):   deferred       Subluxed Digits (Rt):  no subluxation or laxity of joints  Additional Musculoskelatal Findings    No obvious deformity or instability.    No pain with palpation to the " right ankle.      Assessment/Plan   Mark was seen today for follow-up.    Diagnoses and all orders for this visit:    Acute right ankle pain  -     XR Ankle 3+ View Right      Patient is doing quite well at this time.  Imaging was performed showing well-healed lateral malleolus fracture.  No other issues are present.  I have asked that he return to a regular shoe and normal activity.  He is to call with any additional issues or concerns.  I will see him as needed.    Orders Placed This Encounter   Procedures   • XR Ankle 3+ View Right     Order Specific Question:   Reason for Exam:     Answer:   Right ankle FX from May follow up RM 13 NWB          Note is dictated utilizing voice recognition software. Unfortunately this leads to occasional typographical errors. I apologize in advance if the situation occurs. If questions occur please do not hesitate to call our office.

## 2019-10-24 NOTE — PATIENT INSTRUCTIONS
Increase Lantus to 20 units subcu daily  Increase NovoLog to 7 units before each meal  Start atorvastatin 10 g p.o. daily  Follow-up in 3 to 4 months with labs  Always keep glucose source with you in case of low blood sugar  Always get annual eye exam and flu vaccine  Follow-up in 3 to 4 months.

## 2019-10-24 NOTE — PROGRESS NOTES
Endocrine Progress Note Outpatient     Patient Care Team:  Hipolito Wright MD as PCP - General  Galen Montana MD as PCP - Claims Attributed    Chief Complaint: Follow-up diabetes    HPI: 73-year-old male with history of type 1 diabetes brittle, history of hypothyroidism and hypertension hyperlipidemia is here for follow-up.  He is currently on Lantus 16 units daily along with NovoLog 6 units twice a day as well as sliding scale.  [place] lives [place], no  records for review.   Hypertension well controlled  Hyperlipidemia: Not on meds.   Hypothyroidism: On levothyroxine supplementation.    Past Medical History:   Diagnosis Date   • CAD (coronary artery disease)    • CVA (cerebral vascular accident) (CMS/HCC)    • Dementia (CMS/HCC)     early stages   • Diabetes mellitus type 1 (CMS/HCC)    • Diabetic neuropathy (CMS/HCC)    • Hepatic failure (CMS/HCC)    • Hyperlipidemia    • Hypertension    • Thrombocytopenia (CMS/HCC)        Social History     Socioeconomic History   • Marital status:      Spouse name: Not on file   • Number of children: Not on file   • Years of education: Not on file   • Highest education level: Not on file   Tobacco Use   • Smoking status: Former Smoker   • Smokeless tobacco: Former User   Substance and Sexual Activity   • Alcohol use: No     Frequency: Never   • Drug use: No   • Sexual activity: Defer       Family History   Problem Relation Age of Onset   • Heart disease Mother    • Stroke Father    • Diabetes Son        Allergies   Allergen Reactions   • Keppra  [Levetiracetam] Rash       ROS:   Constitutional:  Denies fatigue, tiredness.    Eyes:  Denies change in visual acuity   HENT:  Denies nasal congestion or sore throat   Respiratory: denies cough, shortness of breath.   Cardiovascular:  denies chest pain, edema   GI:  Denies abdominal pain, nausea, vomiting.   Musculoskeletal:  Denies back pain or joint pain   Integument:  Denies dry skin and rash   Neurologic:  Denies headache, focal  weakness or sensory changes   Endocrine:  Denies polyuria or polydipsia   Psychiatric:  Denies depression or anxiety      Vitals:    10/24/19 1044   BP: 124/64   Pulse: 59   SpO2: 96%       Physical Exam:  GEN: NAD, conversant in wheelchair.   EYES: EOMI, PERRL, no conjunctival erythema  NECK: no thyromegaly, full ROM   CV: RRR, no murmurs/rubs/gallops, no peripheral edema  LUNG: CTAB, no wheezes/rales/ronchi  SKIN: no rashes, no acanthosis  MSK: no deformities, full ROM of all extremities  NEURO: no tremors, DTR normal  PSYCH: AOX3, appropriate mood, affect normal      Results Review:     I reviewed the patient's new clinical results.      Lab Results   Component Value Date    GLUCOSE 264 (H) 01/12/2018    BUN 22 (H) 01/12/2018    CREATININE 1.0 01/12/2018    BCR 22.0 (H) 01/12/2018    K 3.8 01/12/2018    CO2 23 01/12/2018    CALCIUM 8.5 (L) 01/12/2018    ALBUMIN 2.9 (L) 01/10/2018    AST 40 01/10/2018    ALT 27 01/10/2018    CHOL 188 04/01/2017    TRIG 72 04/01/2017    LDL 81 04/01/2017    HDL 82 04/01/2017     Lab Results   Component Value Date    TSH 2.47 03/31/2017     Labs from October 24, 2019 showed sodium of 139, potassium 4.6, chloride 105, 0 29, glucose 202, BUN 40, creatinine 1.1, AST 15, ALT 14, , total cholesterol 263, protein myoglobin creatinine ratio 466, A1c was 10.5.    Medication Review: Reviewed.       Current Outpatient Medications:   •  albuterol sulfate HFA (VENTOLIN HFA) 108 (90 Base) MCG/ACT inhaler, as needed, Disp: , Rfl:   •  aspirin 81 MG EC tablet, Take 81 mg by mouth Daily., Disp: , Rfl:   •  baclofen (LIORESAL) 10 MG tablet, daily, Disp: , Rfl:   •  benztropine (COGENTIN) 1 MG tablet, Daily., Disp: , Rfl:   •  budesonide (PULMICORT) 0.5 MG/2ML nebulizer solution, once daily, Disp: , Rfl:   •  busPIRone (BUSPAR) 15 MG tablet, Daily., Disp: , Rfl:   •  cetirizine (zyrTEC) 10 MG tablet, Take 10 mg by mouth Daily., Disp: , Rfl:   •  donepezil (ARICEPT) 10 MG tablet, Every 12  "(Twelve) Hours., Disp: , Rfl:   •  DULoxetine (CYMBALTA) 60 MG capsule, once daily, Disp: , Rfl:   •  escitalopram (LEXAPRO) 20 MG tablet, Daily., Disp: , Rfl:   •  ferrous sulfate 325 (65 FE) MG tablet, three time daily, Disp: , Rfl:   •  glucagon (GLUCAGON EMERGENCY) 1 MG injection, use n case of hypoglycemia, Disp: , Rfl:   •  HYDROcodone-acetaminophen (NORCO) 7.5-325 MG per tablet, every 8 hours as needed, Disp: , Rfl:   •  insulin aspart (NOVOLOG) 100 UNIT/ML injection, 6 units with each meal, Disp: , Rfl:   •  insulin glargine (LANTUS) 100 UNIT/ML injection, 16 units subcu daily, Disp: , Rfl:   •  ipratropium-albuterol (DUO-NEB) 0.5-2.5 mg/3 ml nebulizer, as needed, Disp: , Rfl:   •  isosorbide mononitrate (IMDUR) 30 MG 24 hr tablet, Daily., Disp: , Rfl:   •  levothyroxine (SYNTHROID, LEVOTHROID) 50 MCG tablet, once daily, Disp: , Rfl:   •  Magnesium 400 MG tablet, twice daily, Disp: , Rfl:   •  melatonin 3 MG tablet, at bedtime, Disp: , Rfl:   •  multivitamin (DAILY ISAIAH) tablet tablet, DAILY ISAIAH TABS, Disp: , Rfl:   •  pregabalin (LYRICA) 300 MG capsule, 1 capsule Every 12 (Twelve) Hours., Disp: , Rfl:   •  promethazine (PHENERGAN) 25 MG tablet, as needed, Disp: , Rfl:   •  rOPINIRole (REQUIP) 2 MG tablet, Daily., Disp: , Rfl:   •  rOPINIRole (REQUIP) 4 MG tablet, Every Night., Disp: , Rfl:   •  saccharomyces boulardii (FLORASTOR) 250 MG capsule, twice dailu, Disp: , Rfl:       Assessment/Plan   1.  Diabetes mellitus type I: Uncontrolled, will increase Lantus to 20 units subcu daily and increase NovoLog to 7 units with each meal along with sliding scale.  2.  Hypertension: Well-controlled  3.  Hyperlipidemia: Uncontrolled with high LDL, add atorvastatin 10 mg p.o. daily and follow lipid panel.  4.  Hypothyroidism: On levothyroxine, will follow TSH.            Kelli Hinds MD FACE.  06/15/19  4:34 PM      EMR Dragon / transcription disclaimer:     \"Dictated utilizing Dragon dictation\".                 " Detail Level: Detailed

## 2019-10-25 NOTE — PROGRESS NOTES
Subjective: TREMORS/RLS/NEUROPATHY    Patient ID: Mark Rivera is a 73 y.o. male.    Chief complaint: neuropathy       Patient is here for follow up visit on Diabetic peripheral neuropathy, and RLS.  Neuropathy is associated with pain in feet. Numbness above knees and in hands. Also some pain in hands like the feet.  He is taking lyrica 300 mg bid for the pain      RLS no change currently taking requip he is still having rls during the night getting worse. Taking 6mg ropinirole at 7 and 11pm    Tremors, the same no change.  Tremors in both hands.  Patient states the body jerking is keeping him up at night.  He gets 4 - 5 hours of sleep a night.      Patient has fallen 6 times since last visit.  Walks with walker, Falls when making transfers.       The following portions of the patient's history were reviewed and updated as appropriate: allergies, current medications, past family history, past medical history, past social history, past surgical history and problem list.    Family History   Problem Relation Age of Onset   • Heart disease Mother    • Stroke Father    • Diabetes Son        Past Medical History:   Diagnosis Date   • CAD (coronary artery disease)    • CVA (cerebral vascular accident) (CMS/HCC)    • Dementia (CMS/HCC)     early stages   • Diabetes mellitus type 1 (CMS/HCC)    • Diabetic neuropathy (CMS/HCC)    • Hepatic failure (CMS/HCC)    • Hyperlipidemia    • Hypertension    • Thrombocytopenia (CMS/HCC)        Social History     Socioeconomic History   • Marital status:      Spouse name: Not on file   • Number of children: Not on file   • Years of education: Not on file   • Highest education level: Not on file   Tobacco Use   • Smoking status: Former Smoker   • Smokeless tobacco: Former User   Substance and Sexual Activity   • Alcohol use: No     Frequency: Never   • Drug use: No   • Sexual activity: Defer         Current Outpatient Medications:   •  albuterol sulfate HFA (VENTOLIN HFA) 108 (90  Base) MCG/ACT inhaler, as needed, Disp: , Rfl:   •  aspirin 81 MG EC tablet, Take 81 mg by mouth Daily., Disp: , Rfl:   •  atorvastatin (LIPITOR) 10 MG tablet, Take 1 tablet by mouth Daily., Disp: 30 tablet, Rfl: 11  •  baclofen (LIORESAL) 10 MG tablet, daily, Disp: , Rfl:   •  benztropine (COGENTIN) 1 MG tablet, Daily., Disp: , Rfl:   •  budesonide (PULMICORT) 0.5 MG/2ML nebulizer solution, once daily, Disp: , Rfl:   •  busPIRone (BUSPAR) 15 MG tablet, Daily., Disp: , Rfl:   •  cetirizine (zyrTEC) 10 MG tablet, Take 10 mg by mouth Daily., Disp: , Rfl:   •  donepezil (ARICEPT) 10 MG tablet, Every 12 (Twelve) Hours., Disp: , Rfl:   •  DULoxetine (CYMBALTA) 60 MG capsule, once daily, Disp: , Rfl:   •  escitalopram (LEXAPRO) 20 MG tablet, Daily., Disp: , Rfl:   •  ferrous sulfate 325 (65 FE) MG tablet, three time daily, Disp: , Rfl:   •  glucagon (GLUCAGON EMERGENCY) 1 MG injection, use n case of hypoglycemia, Disp: , Rfl:   •  HYDROcodone-acetaminophen (NORCO) 7.5-325 MG per tablet, every 8 hours as needed, Disp: , Rfl:   •  insulin aspart (NOVOLOG) 100 UNIT/ML injection, Inject 7 units before each meal, Disp: 10 mL, Rfl: 4  •  insulin glargine (LANTUS) 100 UNIT/ML injection, Inject 20 units subcu daily, Disp: 10 mL, Rfl: 4  •  ipratropium-albuterol (DUO-NEB) 0.5-2.5 mg/3 ml nebulizer, as needed, Disp: , Rfl:   •  isosorbide mononitrate (IMDUR) 30 MG 24 hr tablet, Daily., Disp: , Rfl:   •  levothyroxine (SYNTHROID, LEVOTHROID) 50 MCG tablet, once daily, Disp: , Rfl:   •  Magnesium 400 MG tablet, twice daily, Disp: , Rfl:   •  melatonin 3 MG tablet, at bedtime, Disp: , Rfl:   •  multivitamin (DAILY ISAIAH) tablet tablet, DAILY ISAIAH TABS, Disp: , Rfl:   •  pregabalin (LYRICA) 300 MG capsule, 1 capsule Every 12 (Twelve) Hours., Disp: , Rfl:   •  promethazine (PHENERGAN) 25 MG tablet, as needed, Disp: , Rfl:   •  rOPINIRole (REQUIP) 2 MG tablet, Daily., Disp: , Rfl:   •  rOPINIRole (REQUIP) 4 MG tablet, Every Night., Disp:  , Rfl:   •  saccharomyces boulardii (FLORASTOR) 250 MG capsule, twice dailu, Disp: , Rfl:     Review of Systems   Constitutional: Negative for appetite change and chills.   HENT: Negative for sinus pressure and sinus pain.    Eyes: Negative for discharge and itching.   Respiratory: Negative for cough and choking.    Cardiovascular: Negative for chest pain.   Gastrointestinal: Negative for abdominal pain and nausea.   Endocrine: Negative for cold intolerance and heat intolerance.   Genitourinary: Negative for urgency.   Musculoskeletal: Negative for neck pain and neck stiffness.   Neurological: Positive for tremors. Negative for dizziness and facial asymmetry.   Psychiatric/Behavioral: Negative for behavioral problems and decreased concentration.          I have reviewed ROS completed by medical assistant.     Objective:    Physical Exam   Constitutional: He is oriented to person, place, and time. He appears well-developed and well-nourished.   Neurological: He is alert and oriented to person, place, and time.   decresed temp and LT sensation distally in legs > arms  Mild action tremor   Psychiatric: He has a normal mood and affect.   Vitals reviewed.      Assessment/Plan:    Mark was seen today for tremors, peripheral neuropathy and restless legs syndrome.    Diagnoses and all orders for this visit:    Restless legs syndrome, worsened    Diabetic peripheral neuropathy (CMS/HCC), stable    Neuropathy, continue the lyrica 300mg bid    rls add mirapex at bedtime,       This document has been electronically signed by Joseph Seipel, MD on October 25, 2019 9:15 AM